# Patient Record
Sex: FEMALE | Race: BLACK OR AFRICAN AMERICAN | Employment: OTHER | URBAN - METROPOLITAN AREA
[De-identification: names, ages, dates, MRNs, and addresses within clinical notes are randomized per-mention and may not be internally consistent; named-entity substitution may affect disease eponyms.]

---

## 2020-09-11 VITALS
WEIGHT: 202.2 LBS | HEIGHT: 60 IN | DIASTOLIC BLOOD PRESSURE: 82 MMHG | BODY MASS INDEX: 39.7 KG/M2 | TEMPERATURE: 98.1 F | OXYGEN SATURATION: 99 % | HEART RATE: 78 BPM | SYSTOLIC BLOOD PRESSURE: 140 MMHG

## 2020-09-11 PROBLEM — H93.90 EAR PROBLEM: Status: ACTIVE | Noted: 2020-05-26

## 2020-09-11 RX ORDER — PRAVASTATIN SODIUM 10 MG/1
TABLET ORAL
COMMUNITY

## 2020-09-11 RX ORDER — NIFEDIPINE 60 MG/1
60 TABLET, EXTENDED RELEASE ORAL DAILY
COMMUNITY

## 2020-09-11 RX ORDER — POTASSIUM CHLORIDE 20 MEQ/1
TABLET, EXTENDED RELEASE ORAL 2 TIMES DAILY
COMMUNITY

## 2020-09-11 RX ORDER — AMITRIPTYLINE HYDROCHLORIDE 25 MG/1
TABLET, FILM COATED ORAL
COMMUNITY

## 2020-09-11 RX ORDER — IRBESARTAN 300 MG/1
300 TABLET ORAL
COMMUNITY

## 2020-09-11 RX ORDER — OMEPRAZOLE 40 MG/1
40 CAPSULE, DELAYED RELEASE ORAL DAILY
COMMUNITY

## 2020-09-11 RX ORDER — METOPROLOL SUCCINATE 50 MG/1
TABLET, EXTENDED RELEASE ORAL DAILY
COMMUNITY

## 2020-09-11 RX ORDER — HYDRALAZINE HYDROCHLORIDE 100 MG/1
TABLET, FILM COATED ORAL
COMMUNITY

## 2020-09-11 RX ORDER — TORSEMIDE 100 MG/1
50 TABLET ORAL DAILY
COMMUNITY

## 2020-09-11 RX ORDER — AMLODIPINE BESYLATE 5 MG/1
5 TABLET ORAL DAILY
COMMUNITY

## 2020-09-11 RX ORDER — FUROSEMIDE 40 MG/1
TABLET ORAL DAILY
COMMUNITY

## 2020-09-11 RX ORDER — METHIMAZOLE 5 MG/1
TABLET ORAL
COMMUNITY

## 2021-08-30 ENCOUNTER — OFFICE VISIT (OUTPATIENT)
Dept: SURGERY | Age: 71
End: 2021-08-30
Payer: MEDICARE

## 2021-08-30 VITALS
BODY MASS INDEX: 38.12 KG/M2 | OXYGEN SATURATION: 95 % | WEIGHT: 195.2 LBS | SYSTOLIC BLOOD PRESSURE: 135 MMHG | TEMPERATURE: 97.7 F | RESPIRATION RATE: 18 BRPM | DIASTOLIC BLOOD PRESSURE: 74 MMHG | HEART RATE: 81 BPM

## 2021-08-30 DIAGNOSIS — I65.23 CAROTID ARTERY CALCIFICATION, BILATERAL: Primary | ICD-10-CM

## 2021-08-30 PROCEDURE — 99203 OFFICE O/P NEW LOW 30 MIN: CPT | Performed by: SURGERY

## 2021-08-30 PROCEDURE — 3017F COLORECTAL CA SCREEN DOC REV: CPT | Performed by: SURGERY

## 2021-08-30 PROCEDURE — G8427 DOCREV CUR MEDS BY ELIG CLIN: HCPCS | Performed by: SURGERY

## 2021-08-30 PROCEDURE — G8536 NO DOC ELDER MAL SCRN: HCPCS | Performed by: SURGERY

## 2021-08-30 PROCEDURE — G8419 CALC BMI OUT NRM PARAM NOF/U: HCPCS | Performed by: SURGERY

## 2021-08-30 PROCEDURE — G8510 SCR DEP NEG, NO PLAN REQD: HCPCS | Performed by: SURGERY

## 2021-08-30 PROCEDURE — 1101F PT FALLS ASSESS-DOCD LE1/YR: CPT | Performed by: SURGERY

## 2021-08-30 PROCEDURE — 1090F PRES/ABSN URINE INCON ASSESS: CPT | Performed by: SURGERY

## 2021-08-30 PROCEDURE — G8400 PT W/DXA NO RESULTS DOC: HCPCS | Performed by: SURGERY

## 2021-08-30 RX ORDER — ASPIRIN 81 MG/1
81 TABLET ORAL DAILY
COMMUNITY

## 2021-08-30 RX ORDER — CHOLECALCIFEROL (VITAMIN D3) 125 MCG
1 CAPSULE ORAL DAILY
COMMUNITY

## 2021-08-30 NOTE — PROGRESS NOTES
VASCULAR history and physical      Subjective:   CHIEF COMPLAINTS:  Bilateral carotid stenosis. PRESENTATION OF ILLNESS:    Ms. Riccardo Bunch is a very pleasant 80 years woman referred from Dr. Miriam Calvert with newly found bilateral carotid stenosis. Patient is a currently asymptomatic. Patient does not have any GI symptoms. Patient denies any dizzy spell, denies any acute vision problem such as amaurosis fugax, denies any speech problem denies any arm or leg weakness or numbness. Patient otherwise pretty active person. Patient was told her cholesterol was a bit high. Denies otherwise any other health issues other than thyroid problem. Patient has some mild hypertension as well. Patient denies any family history of stroke or aneurysm disorder. Past Medical History:   Diagnosis Date    Ear problem     High cholesterol     Hypertension       Past Surgical History:   Procedure Laterality Date    HX ORTHOPAEDIC       Family History   Problem Relation Age of Onset    Hypertension Sister     Heart Disease Mother     Hypertension Mother     Heart Disease Father       Social History     Tobacco Use    Smoking status: Never Smoker    Smokeless tobacco: Never Used   Substance Use Topics    Alcohol use: Not Currently       Prior to Admission medications    Medication Sig Start Date End Date Taking? Authorizing Provider   aspirin delayed-release 81 mg tablet Take 81 mg by mouth daily. Yes Provider, Historical   cholecalciferol, vitamin D3, 50 mcg (2,000 unit) tab Take 1 Tablet by mouth daily. Yes Provider, Historical   amitriptyline (ELAVIL) 25 mg tablet Take  by mouth nightly as needed. Yes Provider, Historical   amLODIPine (NORVASC) 5 mg tablet Take 5 mg by mouth daily. Yes Provider, Historical   irbesartan (AVAPRO) 300 mg tablet Take 300 mg by mouth nightly. Yes Provider, Historical   methIMAzole (TAPAZOLE) 5 mg tablet Take  by mouth.    Yes Provider, Historical   metoprolol succinate (TOPROL-XL) 50 mg XL tablet Take  by mouth daily. Yes Provider, Historical   omeprazole (PRILOSEC) 40 mg capsule Take 40 mg by mouth daily. Yes Provider, Historical   potassium chloride (K-DUR, KLOR-CON) 20 mEq tablet Take  by mouth two (2) times a day. Yes Provider, Historical   pravastatin (PRAVACHOL) 10 mg tablet Take  by mouth nightly. Yes Provider, Historical   torsemide (DEMADEX) 100 mg tablet Take 50 mg by mouth daily. Yes Provider, Historical   furosemide (LASIX) 40 mg tablet Take  by mouth daily. Patient not taking: Reported on 8/30/2021    Provider, Historical   hydrALAZINE (APRESOLINE) 100 mg tablet Take  by mouth. Patient not taking: Reported on 8/30/2021    Provider, Historical   NIFEdipine ER (PROCARDIA XL) 60 mg ER tablet Take 60 mg by mouth daily. Provider, Historical     No Known Allergies     Review of Systems:  I reviewed the rest of organ systems personally and they were negative signed by Dr. Eros Rawls    Objective:     Visit Vitals  /74 (BP 1 Location: Left upper arm, BP Patient Position: Sitting, BP Cuff Size: Adult)   Pulse 81   Temp 97.7 °F (36.5 °C) (Temporal)   Resp 18   Wt 195 lb 3.2 oz (88.5 kg)   SpO2 95%   BMI 38.12 kg/m²     VITAL SIGNS REVIEWED. Physical Exam:  Patient is well-nourished pleasant in conversation is appropriate. Head and neck examination atraumatic, normocephalic. Gaze appropriate. Conversation appropriate. Neck examination shows supple. No mass. No obvious carotid bruit. Chest examination shows lungs are clear bilaterally well-expanded, no crackles or wheezes. Cardiovascular system regular rate, no obvious murmur. Skin warm to touch  and moist, no skin lesions. Abdomen is soft ,not tender or distended bowel sounds present. No palpable mass. Neurological examinations, no focal neuro deficits moving all 4 extremities. Cranial nerves intact. Sensation is intact as well. Hematologic: No obvious bruise or swelling or obvious lymphadenopathy.   Psychosocial: Appropriate. Has good effect. Musculoskeletal system: No muscle wasting, appropriate movements upper and lower extremity. Data Review:   No visits with results within 1 Month(s) from this visit. Latest known visit with results is:   No results found for any previous visit. Assessment:     Problem List Items Addressed This Visit        Circulatory    Carotid artery calcification, bilateral - Primary    Relevant Medications    aspirin delayed-release 81 mg tablet              Plan:     I have reviewed the referral reports which shows right carotid artery stenosis less than 50%. However left side does have moderate plaque disease with a 50 to 69% blockage. Last exam was done March 2021. I do strongly recommend repeat ultrasound 6 months from that day which is a next month. So I will schedule for carotid duplex ultrasound September 2021. And I will follow up on the results we will discuss test results together. We talked about atherosclerotic disease process of the plaque disease on the bilateral carotid system, etiology, and risk factor modification as well.         Jean Tomas MD

## 2021-08-30 NOTE — PROGRESS NOTES
Identified pt with two pt identifiers(name and ). Reviewed record in preparation for visit and have obtained necessary documentation. No chief complaint on file. Vitals:    21 0934   BP: 135/74   Pulse: 81   Resp: 18   Temp: 97.7 °F (36.5 °C)   TempSrc: Temporal   SpO2: 95%   Weight: 195 lb 3.2 oz (88.5 kg)   PainSc:   0 - No pain       Health Maintenance Review: Patient reminded of \"due or due soon\" health maintenance. I have asked the patient to contact his/her primary care provider (PCP) for follow-up on his/her health maintenance. Coordination of Care Questionnaire:  :   1) Have you been to an emergency room, urgent care, or hospitalized since your last visit? If yes, where when, and reason for visit? no       2. Have seen or consulted any other health care provider since your last visit? If yes, where when, and reason for visit? NO    ADL Assessment 2021   Feeding yourself No Help Needed   Getting from bed to chair No Help Needed   Getting dressed No Help Needed   Bathing or showering No Help Needed   Walk across the room (includes cane/walker) No Help Needed   Using the telphone No Help Needed   Taking your medications No Help Needed   Preparing meals No Help Needed   Managing money (expenses/bills) No Help Needed   Moderately strenuous housework (laundry) No Help Needed   Shopping for personal items (toiletries/medicines) No Help Needed   Shopping for groceries No Help Needed   Driving No Help Needed   Climbing a flight of stairs No Help Needed   Getting to places beyond walking distances No Help Needed     Abuse Screening Questionnaire 2021   Do you ever feel afraid of your partner? N   Are you in a relationship with someone who physically or mentally threatens you? N   Is it safe for you to go home? Y     Who is the primary learner? Patient    What is the preferred language for health care of the primary learner?  ENGLISH    How does the primary learner prefer to learn new concepts?  VIDEOS    Answered By patient    Relationship to Learner SELF

## 2021-09-07 ENCOUNTER — HOSPITAL ENCOUNTER (OUTPATIENT)
Dept: NON INVASIVE DIAGNOSTICS | Age: 71
Discharge: HOME | End: 2021-09-07
Attending: SURGERY
Payer: MEDICARE

## 2021-09-07 DIAGNOSIS — I65.23 CAROTID ARTERY CALCIFICATION, BILATERAL: ICD-10-CM

## 2021-09-07 PROCEDURE — 93880 EXTRACRANIAL BILAT STUDY: CPT

## 2021-09-08 LAB
LEFT CCA DIST DIAS: 24.1 CM/S
LEFT CCA DIST SYS: 113 CM/S
LEFT CCA PROX DIAS: 31.5 CM/S
LEFT CCA PROX SYS: 155 CM/S
LEFT ECA DIAS: 37.6 CM/S
LEFT ECA SYS: 211 CM/S
LEFT ICA DIST DIAS: 28.8 CM/S
LEFT ICA DIST SYS: 70.4 CM/S
LEFT ICA PROX DIAS: 30.9 CM/S
LEFT ICA PROX SYS: 137 CM/S
LEFT SUBCLAVIAN DIAS: 0 CM/S
LEFT SUBCLAVIAN SYS: 104 CM/S
LEFT VERTEBRAL DIAS: 27 CM/S
LEFT VERTEBRAL SYS: 66.2 CM/S
RIGHT CCA DIST DIAS: 22.2 CM/S
RIGHT CCA DIST SYS: 90.9 CM/S
RIGHT CCA PROX DIAS: 16 CM/S
RIGHT CCA PROX SYS: 104 CM/S
RIGHT ECA DIAS: 11.5 CM/S
RIGHT ECA SYS: 129 CM/S
RIGHT ICA DIST DIAS: 28.5 CM/S
RIGHT ICA DIST SYS: 95.1 CM/S
RIGHT ICA MID DIAS: 31.5 CM/S
RIGHT ICA MID SYS: 111 CM/S
RIGHT ICA PROX DIAS: 23.2 CM/S
RIGHT ICA PROX SYS: 95.1 CM/S
RIGHT SUBCLAVIAN DIAS: 0 CM/S
RIGHT SUBCLAVIAN SYS: 85.5 CM/S
RIGHT VERTEBRAL DIAS: 17.6 CM/S
RIGHT VERTEBRAL SYS: 85.5 CM/S

## 2021-09-08 PROCEDURE — 93880 EXTRACRANIAL BILAT STUDY: CPT | Performed by: SURGERY

## 2021-09-20 ENCOUNTER — OFFICE VISIT (OUTPATIENT)
Dept: SURGERY | Age: 71
End: 2021-09-20
Payer: MEDICARE

## 2021-09-20 VITALS
RESPIRATION RATE: 18 BRPM | HEART RATE: 87 BPM | OXYGEN SATURATION: 96 % | TEMPERATURE: 97.8 F | SYSTOLIC BLOOD PRESSURE: 123 MMHG | DIASTOLIC BLOOD PRESSURE: 66 MMHG | BODY MASS INDEX: 37.97 KG/M2 | WEIGHT: 194.4 LBS

## 2021-09-20 DIAGNOSIS — I65.23 CAROTID ARTERY CALCIFICATION, BILATERAL: Primary | ICD-10-CM

## 2021-09-20 PROCEDURE — 1090F PRES/ABSN URINE INCON ASSESS: CPT | Performed by: SURGERY

## 2021-09-20 PROCEDURE — 1101F PT FALLS ASSESS-DOCD LE1/YR: CPT | Performed by: SURGERY

## 2021-09-20 PROCEDURE — G8510 SCR DEP NEG, NO PLAN REQD: HCPCS | Performed by: SURGERY

## 2021-09-20 PROCEDURE — G8427 DOCREV CUR MEDS BY ELIG CLIN: HCPCS | Performed by: SURGERY

## 2021-09-20 PROCEDURE — 3017F COLORECTAL CA SCREEN DOC REV: CPT | Performed by: SURGERY

## 2021-09-20 PROCEDURE — G8536 NO DOC ELDER MAL SCRN: HCPCS | Performed by: SURGERY

## 2021-09-20 PROCEDURE — G8400 PT W/DXA NO RESULTS DOC: HCPCS | Performed by: SURGERY

## 2021-09-20 PROCEDURE — 99213 OFFICE O/P EST LOW 20 MIN: CPT | Performed by: SURGERY

## 2021-09-20 PROCEDURE — G8419 CALC BMI OUT NRM PARAM NOF/U: HCPCS | Performed by: SURGERY

## 2021-09-20 NOTE — PROGRESS NOTES
Identified pt with two pt identifiers(name and ). Reviewed record in preparation for visit and have obtained necessary documentation. Chief Complaint   Patient presents with    Follow-up     Radiology results      Vitals:    21 1643   BP: 123/66   Pulse: 87   Resp: 18   Temp: 97.8 °F (36.6 °C)   TempSrc: Temporal   SpO2: 96%   Weight: 194 lb 6.4 oz (88.2 kg)   PainSc:   0 - No pain       Health Maintenance Review: Patient reminded of \"due or due soon\" health maintenance. I have asked the patient to contact his/her primary care provider (PCP) for follow-up on his/her health maintenance. Coordination of Care Questionnaire:  :   1) Have you been to an emergency room, urgent care, or hospitalized since your last visit? If yes, where when, and reason for visit? no       2. Have seen or consulted any other health care provider since your last visit? If yes, where when, and reason for visit?   NO

## 2021-09-23 ENCOUNTER — TELEPHONE (OUTPATIENT)
Dept: SURGERY | Age: 71
End: 2021-09-23

## 2021-09-23 NOTE — TELEPHONE ENCOUNTER
Patient called stated was seen in the office on 09/20/2021 and dr Kathy Bradley stated he was ordering a ct scan. Patient spoke with centralized scheduling today and they have not received an order. Patients next appointment is 10/04/2021 to follow up from ct scan.  Please call [phone]

## 2021-09-23 NOTE — TELEPHONE ENCOUNTER
Called patient to let her know that her note had not been signed off on yet and that is why scheduling hasn't called her yet. She verbalized understanding and stated she has already made a follow up appointment for 10/4.

## 2021-09-26 NOTE — PROGRESS NOTES
VASCULAR FOLLOW UP      Subjective:   CHIEF COMPLAINTS:  Carotid artery stenosis. PRESENTATION OF ILLNESS:  Ms. Melecio Ramsey is here today follow-up after carotid duplex ultrasound. She is doing pretty good no major symptoms or chest pain. She denies any amaurosis fugax, acute vision problem, denies any speech problem or lateral symptoms of upper or lower extremities. Patient has known history of bilateral carotid stenosis. Most recent carotid duplex ultrasound from August was reviewed. Past Medical History:   Diagnosis Date    Ear problem     High cholesterol     Hypertension       Past Surgical History:   Procedure Laterality Date    HX ORTHOPAEDIC       Family History   Problem Relation Age of Onset    Hypertension Sister     Heart Disease Mother     Hypertension Mother     Heart Disease Father       Social History     Tobacco Use    Smoking status: Never Smoker    Smokeless tobacco: Never Used   Substance Use Topics    Alcohol use: Not Currently       Prior to Admission medications    Medication Sig Start Date End Date Taking? Authorizing Provider   aspirin delayed-release 81 mg tablet Take 81 mg by mouth daily. Yes Provider, Historical   cholecalciferol, vitamin D3, 50 mcg (2,000 unit) tab Take 1 Tablet by mouth daily. Yes Provider, Historical   amitriptyline (ELAVIL) 25 mg tablet Take  by mouth nightly as needed. Yes Provider, Historical   amLODIPine (NORVASC) 5 mg tablet Take 5 mg by mouth daily. Yes Provider, Historical   irbesartan (AVAPRO) 300 mg tablet Take 300 mg by mouth nightly. Yes Provider, Historical   methIMAzole (TAPAZOLE) 5 mg tablet Take  by mouth. Yes Provider, Historical   metoprolol succinate (TOPROL-XL) 50 mg XL tablet Take  by mouth daily. Yes Provider, Historical   NIFEdipine ER (PROCARDIA XL) 60 mg ER tablet Take 60 mg by mouth daily. Yes Provider, Historical   omeprazole (PRILOSEC) 40 mg capsule Take 40 mg by mouth daily.    Yes Provider, Historical potassium chloride (K-DUR, KLOR-CON) 20 mEq tablet Take  by mouth two (2) times a day. Yes Provider, Historical   pravastatin (PRAVACHOL) 10 mg tablet Take  by mouth nightly. Yes Provider, Historical   torsemide (DEMADEX) 100 mg tablet Take 50 mg by mouth daily. Yes Provider, Historical   furosemide (LASIX) 40 mg tablet Take  by mouth daily. Patient not taking: Reported on 8/30/2021    Provider, Historical   hydrALAZINE (APRESOLINE) 100 mg tablet Take  by mouth. Patient not taking: Reported on 8/30/2021    Provider, Historical     No Known Allergies     Review of Systems:  I reviewed the rest of organ systems personally and they were negative signed by Dr. Laymon Litten    Objective:     Visit Vitals  /66 (BP 1 Location: Left upper arm, BP Patient Position: Sitting, BP Cuff Size: Adult)   Pulse 87   Temp 97.8 °F (36.6 °C) (Temporal)   Resp 18   Wt 194 lb 6.4 oz (88.2 kg)   SpO2 96%   BMI 37.97 kg/m²     VITAL SIGNS REVIEWED. Physical Exam:  Patient is well-nourished pleasant in conversation is appropriate. Head and neck examination atraumatic, normocephalic. Gaze appropriate. Conversation appropriate. Neck examination shows supple. No mass. No obvious carotid bruit. Chest examination shows lungs are clear bilaterally well-expanded, no crackles or wheezes. Cardiovascular system regular rate, no obvious murmur. Skin warm to touch  and moist, no skin lesions. Abdomen is soft ,not tender or distended bowel sounds present. No palpable mass. Neurological examinations, no focal neuro deficits moving all 4 extremities. Cranial nerves intact. Sensation is intact as well. Hematologic: No obvious bruise or swelling or obvious lymphadenopathy. Psychosocial: Appropriate. Has good effect. Musculoskeletal system: No muscle wasting, appropriate movements upper and lower extremity.           Data Review:   Hospital Outpatient Visit on 09/07/2021   Component Date Value Ref Range Status    Left CCA dist sys 09/07/2021 113.0  cm/s Final    Left CCA dist butler 09/07/2021 24.1  cm/s Final    Left CCA prox sys 09/07/2021 155.0  cm/s Final    Left CCA prox butler 09/07/2021 31.5  cm/s Final    Left ICA dist sys 09/07/2021 70.4  cm/s Final    Left ICA dist butler 09/07/2021 28.8  cm/s Final    Left ICA prox sys 09/07/2021 137.0  cm/s Final    Left ICA prox butler 09/07/2021 30.9  cm/s Final    Left ECA sys 09/07/2021 211.0  cm/s Final    LEFT EXTERNAL CAROTID ARTERY D 09/07/2021 37.60  cm/s Final    Left subclavian sys 09/07/2021 104.0  cm/s Final    LEFT SUBCLAVIAN ARTERY D 09/07/2021 0.00  cm/s Final    Left vertebral sys 09/07/2021 66.2  cm/s Final    LEFT VERTEBRAL ARTERY D 09/07/2021 27.00  cm/s Final    Right cca dist sys 09/07/2021 90.9  cm/s Final    Right CCA dist butler 09/07/2021 22.2  cm/s Final    Right CCA prox sys 09/07/2021 104.0  cm/s Final    Right CCA prox butler 09/07/2021 16.0  cm/s Final    Right ICA dist sys 09/07/2021 95.1  cm/s Final    Right ICA dist butler 09/07/2021 28.5  cm/s Final    Right ICA mid sys 09/07/2021 111.0  cm/s Final    Right ICA mid butler 09/07/2021 31.5  cm/s Final    Right ICA prox sys 09/07/2021 95.1  cm/s Final    Right ICA prox butler 09/07/2021 23.2  cm/s Final    Right eca sys 09/07/2021 129.0  cm/s Final    RIGHT EXTERNAL CAROTID ARTERY D 09/07/2021 11.50  cm/s Final    Right subclavian sys 09/07/2021 85.5  cm/s Final    RIGHT SUBCLAVIAN ARTERY D 09/07/2021 0.00  cm/s Final    Right vertebral sys 09/07/2021 85.5  cm/s Final    RIGHT VERTEBRAL ARTERY D 09/07/2021 17.60  cm/s Final        Assessment:     Problem List Items Addressed This Visit        Circulatory    Carotid artery calcification, bilateral - Primary    Relevant Orders    CTA NECK              Plan:     Carotid duplex ultrasound from last month shows 30 to 40% stenosis in the right side system while his left side is a 50 to 79% stenosis. Plaque burden based on ultrasound about 60%.   I did explain this findings with the patient in detail. I do recommend CT angiogram for follow-up to further define the morphology of the especially left side carotid system. I will schedule for that test next week and patient will return to my office to discuss these findings in 2 weeks time.         Frida Suarez MD

## 2021-09-28 ENCOUNTER — HOSPITAL ENCOUNTER (OUTPATIENT)
Dept: CT IMAGING | Age: 71
Discharge: HOME | End: 2021-09-28
Attending: SURGERY
Payer: MEDICARE

## 2021-09-28 ENCOUNTER — HOSPITAL ENCOUNTER (OUTPATIENT)
Dept: LAB | Age: 71
Discharge: HOME | End: 2021-09-28
Attending: SURGERY
Payer: MEDICARE

## 2021-09-28 ENCOUNTER — TRANSCRIBE ORDER (OUTPATIENT)
Dept: REGISTRATION | Age: 71
End: 2021-09-28

## 2021-09-28 DIAGNOSIS — I65.23 BILATERAL CAROTID ARTERY OCCLUSION: ICD-10-CM

## 2021-09-28 DIAGNOSIS — I65.23 CAROTID ARTERY CALCIFICATION, BILATERAL: ICD-10-CM

## 2021-09-28 DIAGNOSIS — I65.23 BILATERAL CAROTID ARTERY OCCLUSION: Primary | ICD-10-CM

## 2021-09-28 LAB — CREAT SERPL-MCNC: 1.15 MG/DL (ref 0.55–1.02)

## 2021-09-28 PROCEDURE — 36415 COLL VENOUS BLD VENIPUNCTURE: CPT

## 2021-09-28 PROCEDURE — 82565 ASSAY OF CREATININE: CPT

## 2021-09-28 PROCEDURE — 70498 CT ANGIOGRAPHY NECK: CPT

## 2021-09-28 PROCEDURE — 74011000636 HC RX REV CODE- 636: Performed by: SURGERY

## 2021-09-28 RX ADMIN — IOPAMIDOL 100 ML: 755 INJECTION, SOLUTION INTRAVENOUS at 10:31

## 2021-10-11 ENCOUNTER — OFFICE VISIT (OUTPATIENT)
Dept: SURGERY | Age: 71
End: 2021-10-11
Payer: MEDICARE

## 2021-10-11 VITALS
DIASTOLIC BLOOD PRESSURE: 63 MMHG | TEMPERATURE: 98 F | SYSTOLIC BLOOD PRESSURE: 137 MMHG | HEART RATE: 80 BPM | RESPIRATION RATE: 16 BRPM | OXYGEN SATURATION: 97 % | BODY MASS INDEX: 38.6 KG/M2 | WEIGHT: 196.6 LBS | HEIGHT: 60 IN

## 2021-10-11 DIAGNOSIS — I65.23 CAROTID ARTERY CALCIFICATION, BILATERAL: Primary | ICD-10-CM

## 2021-10-11 PROCEDURE — G8400 PT W/DXA NO RESULTS DOC: HCPCS | Performed by: SURGERY

## 2021-10-11 PROCEDURE — 3017F COLORECTAL CA SCREEN DOC REV: CPT | Performed by: SURGERY

## 2021-10-11 PROCEDURE — G8427 DOCREV CUR MEDS BY ELIG CLIN: HCPCS | Performed by: SURGERY

## 2021-10-11 PROCEDURE — 1101F PT FALLS ASSESS-DOCD LE1/YR: CPT | Performed by: SURGERY

## 2021-10-11 PROCEDURE — G8417 CALC BMI ABV UP PARAM F/U: HCPCS | Performed by: SURGERY

## 2021-10-11 PROCEDURE — 99213 OFFICE O/P EST LOW 20 MIN: CPT | Performed by: SURGERY

## 2021-10-11 PROCEDURE — 1090F PRES/ABSN URINE INCON ASSESS: CPT | Performed by: SURGERY

## 2021-10-11 PROCEDURE — G8510 SCR DEP NEG, NO PLAN REQD: HCPCS | Performed by: SURGERY

## 2021-10-11 PROCEDURE — G8536 NO DOC ELDER MAL SCRN: HCPCS | Performed by: SURGERY

## 2021-10-11 NOTE — PROGRESS NOTES
Chief Complaint   Patient presents with    Follow-up     CTA Results     Visit Vitals  /63 (BP 1 Location: Left arm, BP Patient Position: Sitting)   Pulse 80   Temp 98 °F (36.7 °C) (Temporal)   Resp 16   Ht 5' (1.524 m)   Wt 196 lb 9.6 oz (89.2 kg)   SpO2 97%   BMI 38.40 kg/m²     1. Have you been to the ER, urgent care clinic since your last visit? Hospitalized since your last visit? No    2. Have you seen or consulted any other health care providers outside of the [address] [place] [address] since your last visit? Include any pap smears or colon screening.  Yes,PCP

## 2021-10-14 NOTE — PROGRESS NOTES
VASCULAR FOLLOW UP      Subjective:   CHIEF COMPLAINTS:  Follow-up CT angiogram neck. PRESENTATION OF ILLNESS:    Ms. Tee Titus recently had a carotid duplex ultrasound which shows 50 to 79% blockage on the left side. Followed by she had a CT scan done. She is currently asymptomatic. She has no amaurosis fugax, speech problem or other neurological manifestation. Past Medical History:   Diagnosis Date    Ear problem     High cholesterol     Hypertension       Past Surgical History:   Procedure Laterality Date    HX ORTHOPAEDIC       Family History   Problem Relation Age of Onset    Hypertension Sister     Heart Disease Mother     Hypertension Mother     Heart Disease Father       Social History     Tobacco Use    Smoking status: Never Smoker    Smokeless tobacco: Never Used   Substance Use Topics    Alcohol use: Not Currently       Prior to Admission medications    Medication Sig Start Date End Date Taking? Authorizing Provider   aspirin delayed-release 81 mg tablet Take 81 mg by mouth daily. Yes Provider, Historical   cholecalciferol, vitamin D3, 50 mcg (2,000 unit) tab Take 1 Tablet by mouth daily. Yes Provider, Historical   amitriptyline (ELAVIL) 25 mg tablet Take  by mouth nightly as needed. Yes Provider, Historical   amLODIPine (NORVASC) 5 mg tablet Take 5 mg by mouth daily. Yes Provider, Historical   irbesartan (AVAPRO) 300 mg tablet Take 300 mg by mouth nightly. Yes Provider, Historical   methIMAzole (TAPAZOLE) 5 mg tablet Take  by mouth. Yes Provider, Historical   metoprolol succinate (TOPROL-XL) 50 mg XL tablet Take  by mouth daily. Yes Provider, Historical   NIFEdipine ER (PROCARDIA XL) 60 mg ER tablet Take 60 mg by mouth daily. Yes Provider, Historical   omeprazole (PRILOSEC) 40 mg capsule Take 40 mg by mouth daily. Yes Provider, Historical   potassium chloride (K-DUR, KLOR-CON) 20 mEq tablet Take  by mouth two (2) times a day.    Yes Provider, Historical   pravastatin (PRAVACHOL) 10 mg tablet Take  by mouth nightly. Yes Provider, Historical   torsemide (DEMADEX) 100 mg tablet Take 50 mg by mouth daily. Yes Provider, Historical   furosemide (LASIX) 40 mg tablet Take  by mouth daily. Patient not taking: Reported on 8/30/2021    Provider, Historical   hydrALAZINE (APRESOLINE) 100 mg tablet Take  by mouth. Patient not taking: Reported on 8/30/2021    Provider, Historical     No Known Allergies     Review of Systems:  I reviewed the rest of organ systems personally and they were negative signed by Dr. Tavon Gann    Objective:     Visit Vitals  /63 (BP 1 Location: Left arm, BP Patient Position: Sitting)   Pulse 80   Temp 98 °F (36.7 °C) (Temporal)   Resp 16   Ht 5' (1.524 m)   Wt 196 lb 9.6 oz (89.2 kg)   SpO2 97%   BMI 38.40 kg/m²     VITAL SIGNS REVIEWED. Physical Exam:  Patient is well-nourished pleasant in conversation is appropriate. Head and neck examination atraumatic, normocephalic. Gaze appropriate. Conversation appropriate. Neck examination shows supple. No mass. No obvious carotid bruit. Chest examination shows lungs are clear bilaterally well-expanded, no crackles or wheezes. Cardiovascular system regular rate, no obvious murmur. Skin warm to touch  and moist, no skin lesions. Abdomen is soft ,not tender or distended bowel sounds present. No palpable mass. Neurological examinations, no focal neuro deficits moving all 4 extremities. Cranial nerves intact. Sensation is intact as well. Hematologic: No obvious bruise or swelling or obvious lymphadenopathy. Psychosocial: Appropriate. Has good effect. Musculoskeletal system: No muscle wasting, appropriate movements upper and lower extremity.   Vascular         Data Review:   Hospital Outpatient Visit on 09/28/2021   Component Date Value Ref Range Status    Creatinine 09/28/2021 1.15* 0.55 - 1.02 mg/dL Final        Assessment:     Problem List Items Addressed This Visit        Circulatory    Carotid artery calcification, bilateral - Primary              Plan:     CT angiogram shows 70% high-grade stenosis left proximal ICA. Patient has no symptoms. So we talked about options of continue watch this with repeat carotid duplex ultrasound 6 months or surgical options of carotid endarterectomy. .  Patient will let me know after she has discussed this with the family. If she decides to have surgery she would need a cardiac clearance.           Jaya Marino MD

## 2022-03-18 PROBLEM — I65.23 CAROTID ARTERY CALCIFICATION, BILATERAL: Status: ACTIVE | Noted: 2021-08-30

## 2022-03-18 PROBLEM — H93.90 EAR PROBLEM: Status: ACTIVE | Noted: 2020-05-26

## 2023-05-14 RX ORDER — OMEPRAZOLE 40 MG/1
40 CAPSULE, DELAYED RELEASE ORAL DAILY
COMMUNITY

## 2023-05-14 RX ORDER — AMITRIPTYLINE HYDROCHLORIDE 25 MG/1
TABLET, FILM COATED ORAL
COMMUNITY

## 2023-05-14 RX ORDER — POTASSIUM CHLORIDE 20 MEQ/1
TABLET, EXTENDED RELEASE ORAL 2 TIMES DAILY
COMMUNITY

## 2023-05-14 RX ORDER — NIFEDIPINE 60 MG/1
60 TABLET, EXTENDED RELEASE ORAL DAILY
COMMUNITY

## 2023-05-14 RX ORDER — TORSEMIDE 100 MG/1
50 TABLET ORAL DAILY
COMMUNITY

## 2023-05-14 RX ORDER — FUROSEMIDE 40 MG/1
TABLET ORAL DAILY
COMMUNITY

## 2023-05-14 RX ORDER — IRBESARTAN 300 MG/1
300 TABLET ORAL NIGHTLY
COMMUNITY

## 2023-05-14 RX ORDER — PRAVASTATIN SODIUM 10 MG
TABLET ORAL
COMMUNITY

## 2023-05-14 RX ORDER — ASPIRIN 81 MG/1
81 TABLET ORAL DAILY
COMMUNITY

## 2023-05-14 RX ORDER — METHIMAZOLE 5 MG/1
TABLET ORAL
COMMUNITY

## 2023-05-14 RX ORDER — AMLODIPINE BESYLATE 5 MG/1
5 TABLET ORAL DAILY
COMMUNITY

## 2023-05-14 RX ORDER — METOPROLOL SUCCINATE 50 MG/1
TABLET, EXTENDED RELEASE ORAL DAILY
COMMUNITY

## 2023-05-14 RX ORDER — HYDRALAZINE HYDROCHLORIDE 100 MG/1
TABLET, FILM COATED ORAL
COMMUNITY

## 2024-09-30 ENCOUNTER — HOSPITAL ENCOUNTER (OUTPATIENT)
Facility: HOSPITAL | Age: 74
Setting detail: OUTPATIENT SURGERY
Discharge: HOME | End: 2024-09-30
Attending: INTERNAL MEDICINE | Admitting: INTERNAL MEDICINE
Payer: MEDICARE

## 2024-09-30 ENCOUNTER — ANESTHESIA (OUTPATIENT)
Facility: HOSPITAL | Age: 74
End: 2024-09-30
Payer: MEDICARE

## 2024-09-30 ENCOUNTER — ANESTHESIA EVENT (OUTPATIENT)
Facility: HOSPITAL | Age: 74
End: 2024-09-30
Payer: MEDICARE

## 2024-09-30 VITALS
HEIGHT: 60 IN | HEART RATE: 71 BPM | RESPIRATION RATE: 18 BRPM | DIASTOLIC BLOOD PRESSURE: 71 MMHG | TEMPERATURE: 97.3 F | BODY MASS INDEX: 34.75 KG/M2 | SYSTOLIC BLOOD PRESSURE: 131 MMHG | OXYGEN SATURATION: 100 % | WEIGHT: 177 LBS

## 2024-09-30 PROCEDURE — 7100000010 HC PHASE II RECOVERY - FIRST 15 MIN: Performed by: INTERNAL MEDICINE

## 2024-09-30 PROCEDURE — 2709999900 HC NON-CHARGEABLE SUPPLY: Performed by: INTERNAL MEDICINE

## 2024-09-30 PROCEDURE — 3600007502: Performed by: INTERNAL MEDICINE

## 2024-09-30 PROCEDURE — 2500000003 HC RX 250 WO HCPCS: Performed by: NURSE ANESTHETIST, CERTIFIED REGISTERED

## 2024-09-30 PROCEDURE — 2580000003 HC RX 258: Performed by: INTERNAL MEDICINE

## 2024-09-30 PROCEDURE — 3700000000 HC ANESTHESIA ATTENDED CARE: Performed by: INTERNAL MEDICINE

## 2024-09-30 PROCEDURE — 7100000011 HC PHASE II RECOVERY - ADDTL 15 MIN: Performed by: INTERNAL MEDICINE

## 2024-09-30 PROCEDURE — 3600007512: Performed by: INTERNAL MEDICINE

## 2024-09-30 PROCEDURE — 3700000001 HC ADD 15 MINUTES (ANESTHESIA): Performed by: INTERNAL MEDICINE

## 2024-09-30 PROCEDURE — 6360000002 HC RX W HCPCS: Performed by: NURSE ANESTHETIST, CERTIFIED REGISTERED

## 2024-09-30 RX ORDER — CLONIDINE HYDROCHLORIDE 0.2 MG/1
0.2 TABLET ORAL
COMMUNITY

## 2024-09-30 RX ORDER — PANTOPRAZOLE SODIUM 40 MG/10ML
40 INJECTION, POWDER, LYOPHILIZED, FOR SOLUTION INTRAVENOUS DAILY
COMMUNITY

## 2024-09-30 RX ORDER — PROPOFOL 10 MG/ML
INJECTION, EMULSION INTRAVENOUS
Status: DISCONTINUED | OUTPATIENT
Start: 2024-09-30 | End: 2024-09-30 | Stop reason: SDUPTHER

## 2024-09-30 RX ORDER — LIDOCAINE HYDROCHLORIDE 20 MG/ML
INJECTION, SOLUTION EPIDURAL; INFILTRATION; INTRACAUDAL; PERINEURAL
Status: DISCONTINUED | OUTPATIENT
Start: 2024-09-30 | End: 2024-09-30 | Stop reason: SDUPTHER

## 2024-09-30 RX ORDER — SODIUM CHLORIDE, SODIUM LACTATE, POTASSIUM CHLORIDE, CALCIUM CHLORIDE 600; 310; 30; 20 MG/100ML; MG/100ML; MG/100ML; MG/100ML
INJECTION, SOLUTION INTRAVENOUS CONTINUOUS
Status: DISCONTINUED | OUTPATIENT
Start: 2024-09-30 | End: 2024-09-30 | Stop reason: HOSPADM

## 2024-09-30 RX ADMIN — PROPOFOL 20 MG: 10 INJECTION, EMULSION INTRAVENOUS at 10:59

## 2024-09-30 RX ADMIN — SODIUM CHLORIDE, POTASSIUM CHLORIDE, SODIUM LACTATE AND CALCIUM CHLORIDE: 600; 310; 30; 20 INJECTION, SOLUTION INTRAVENOUS at 10:03

## 2024-09-30 RX ADMIN — PROPOFOL 20 MG: 10 INJECTION, EMULSION INTRAVENOUS at 11:04

## 2024-09-30 RX ADMIN — PROPOFOL 100 MG: 10 INJECTION, EMULSION INTRAVENOUS at 10:56

## 2024-09-30 RX ADMIN — PROPOFOL 20 MG: 10 INJECTION, EMULSION INTRAVENOUS at 11:05

## 2024-09-30 RX ADMIN — LIDOCAINE HYDROCHLORIDE 80 MG: 20 SOLUTION INTRAVENOUS at 10:56

## 2024-09-30 RX ADMIN — PROPOFOL 40 MG: 10 INJECTION, EMULSION INTRAVENOUS at 11:02

## 2024-09-30 RX ADMIN — PROPOFOL 20 MG: 10 INJECTION, EMULSION INTRAVENOUS at 11:00

## 2024-09-30 ASSESSMENT — PAIN - FUNCTIONAL ASSESSMENT
PAIN_FUNCTIONAL_ASSESSMENT: 0-10

## 2024-09-30 NOTE — ANESTHESIA PRE PROCEDURE
Department of Anesthesiology  Preprocedure Note       Name:  Joi Machado   Age:  74 y.o.  :  1950                                          MRN:  [id number]         Date:  2024      Surgeon: Surgeon(s):  Kahlil Valencia MD    Procedure: Procedure(s):  COLONOSCOPY WITH BIOPSY WITH POLYP REMOVAL    Medications prior to admission:   Prior to Admission medications    Medication Sig Start Date End Date Taking? Authorizing Provider   cloNIDine (CATAPRES) 0.2 MG tablet Take 1 tablet by mouth nightly as needed   Yes Provider, Historical, MD   Multiple Vitamins-Minerals (CENTRUM SILVER PO) Take by mouth   Yes Provider, Historical, MD   pantoprazole (PROTONIX) 40 MG injection Infuse 40 mg intravenously daily   Yes Provider, Historical, MD   amitriptyline (ELAVIL) 25 MG tablet Take by mouth  Patient not taking: Reported on 2024    Automatic Reconciliation, Ar   amLODIPine (NORVASC) 5 MG tablet Take 1 tablet by mouth daily    Automatic Reconciliation, Ar   aspirin 81 MG EC tablet Take 1 tablet by mouth daily    Automatic Reconciliation, Ar   Cholecalciferol 50 MCG (2000 UT) TABS Take 1 tablet by mouth daily    Automatic Reconciliation, Ar   furosemide (LASIX) 40 MG tablet Take by mouth daily  Patient not taking: Reported on 2024    Automatic Reconciliation, Ar   hydrALAZINE (APRESOLINE) 100 MG tablet Take by mouth  Patient not taking: Reported on 2024    Automatic Reconciliation, Ar   irbesartan (AVAPRO) 300 MG tablet Take 1 tablet by mouth nightly    Automatic Reconciliation, Ar   methIMAzole (TAPAZOLE) 5 MG tablet Take by mouth    Automatic Reconciliation, Ar   metoprolol succinate (TOPROL XL) 50 MG extended release tablet Take by mouth daily    Automatic Reconciliation, Ar   NIFEdipine (PROCARDIA XL) 60 MG extended release tablet Take 1 tablet by mouth daily  Patient not taking: Reported on 2024    Automatic Reconciliation, Ar   omeprazole (PRILOSEC) 40 MG delayed release capsule Take 1

## 2024-09-30 NOTE — DISCHARGE INSTRUCTIONS
GASTROINTESTINAL - PEDIATRIC    • Minimal or absence of nausea and vomiting Progressing        Patient/Family Goals    • Patient/Family Long Term Goal Progressing    • Patient/Family Short Term Goal Progressing        RESPIRATORY - PEDIATRIC    • Achieves RESUME ASPIRIN TOMORROW

## 2024-09-30 NOTE — PROGRESS NOTES
PT ASSISTED TO BR , IV DC'D SITE INTACT NO SWELLING NOTED , DISCHARGE INSTRUCTIONS GIVEN TO PT AND PT'S  JEFFREY , BOTH VERBALIZED UNDERSTANDING , NO DISTRESS NOTED

## 2024-09-30 NOTE — ANESTHESIA POSTPROCEDURE EVALUATION
Department of Anesthesiology  Postprocedure Note    Patient: Joi Machado  MRN: [id number]  YOB: 1950  Date of evaluation: 9/30/2024    Procedure Summary       Date: 09/30/24 Room / Location: [facility] ENDO 03 / [facility] ENDOSCOPY    Anesthesia Start: 1050 Anesthesia Stop: 1108    Procedure: COLONOSCOPY WITH BIOPSY WITH POLYP REMOVAL (Lower GI Region) Diagnosis:       Colon cancer screening      (Colon cancer screening [Z12.11])    Surgeons: Kahlil Valencia MD Responsible Provider: Magalis Ashley MD    Anesthesia Type: MAC ASA Status: 2            Anesthesia Type: MAC    Marvin Phase I: Marvin Score: 10    Marvin Phase II:      Anesthesia Post Evaluation    Patient location during evaluation: bedside  Patient participation: complete - patient participated  Level of consciousness: sleepy but conscious  Pain score: 0  Airway patency: patent  Nausea & Vomiting: no vomiting and no nausea  Cardiovascular status: blood pressure returned to baseline  Respiratory status: acceptable  Hydration status: stable  Pain management: adequate    No notable events documented.

## (undated) DEVICE — CANNULA NASAL ADULT 10FT ETCO2/CO2 VENTFLO

## (undated) DEVICE — ENDOSCOPIC KIT 1.1+ 6 FT OP3 DE

## (undated) DEVICE — MASK O2 MED AD 7 FT 3 IN 1 W/ STD CONN LTX

## (undated) DEVICE — MASK ANES INF SZ 2 PREM TAIL VLV INFL PRT UNSCENTED SGL PT